# Patient Record
Sex: MALE | Race: WHITE | NOT HISPANIC OR LATINO | Employment: FULL TIME | URBAN - METROPOLITAN AREA
[De-identification: names, ages, dates, MRNs, and addresses within clinical notes are randomized per-mention and may not be internally consistent; named-entity substitution may affect disease eponyms.]

---

## 2018-06-01 ENCOUNTER — HISTORICAL (OUTPATIENT)
Dept: ADMINISTRATIVE | Facility: HOSPITAL | Age: 63
End: 2018-06-01

## 2018-06-04 LAB — FINAL CULTURE: NORMAL

## 2018-06-07 LAB
FINAL CULTURE: NORMAL
FINAL CULTURE: NORMAL

## 2021-01-12 ENCOUNTER — HISTORICAL (OUTPATIENT)
Dept: ADMINISTRATIVE | Facility: HOSPITAL | Age: 66
End: 2021-01-12

## 2021-07-28 ENCOUNTER — HISTORICAL (OUTPATIENT)
Dept: ADMINISTRATIVE | Facility: HOSPITAL | Age: 66
End: 2021-07-28

## 2022-04-07 ENCOUNTER — HISTORICAL (OUTPATIENT)
Dept: ADMINISTRATIVE | Facility: HOSPITAL | Age: 67
End: 2022-04-07

## 2022-04-23 VITALS
OXYGEN SATURATION: 98 % | BODY MASS INDEX: 28.63 KG/M2 | HEIGHT: 70 IN | DIASTOLIC BLOOD PRESSURE: 86 MMHG | WEIGHT: 200 LBS | SYSTOLIC BLOOD PRESSURE: 136 MMHG

## 2022-04-30 NOTE — DISCHARGE SUMMARY
Patient:   Arturo Arreola            MRN: [id number]            FIN: [id number]               Age:   63 years     Sex:  Male     :  1955   Associated Diagnoses:   None   Author:   Coleen Barnes NP      History of present illness:  This is a 63-year-old male who is a patient of Dr. Damon followed for IPF initially diagnosed in  and also chronic obstructive pulmonary disease stage II.  He presented to the emergency room at [facility] on 2018 with complaints of worsening shortness of breath.  The patient stated Monday he started having complaints of the development of a head cold which eventually got better and by Thursday he was feeling fine.  However Friday he went to do some yard work outside and began to have complaints of sudden worsening shortness of breath prompting further evaluation here.  He denies any fever, chills, or exposure to sick contacts.    Since his admit he feels significantly improved and no longer has complaints of worsening shortness of breath.  He does admit to having some wheezing the above symptoms, which were present on admit exam and have improved. This AM he is anxious to be discharged home.     DC meds:   Prednisone 40/30/10 mg ×3 days each   Z-Brando take as prescribed  Albuterol nebs 2.5 mg every 6 hours as needed shortness of breath/wheezing  Symbicort 80/4.5 mcg 2 puffs twice daily  Vytorin 10/40 mg every afternoon    Physical Assessment:   General:  male sitting in the bed not in any distress  Neuro: Alert awake oriented no neurological deficits appreciated  HEENT: Nasal cannula in place otherwise unremarkable  Cardiac: RRR  Respiratory: faint Wheezing heard to bilateral lower lobes   Abd: Soft, round, positive bowel sounds, nontender  Ext: No edema to bilateral lower extremities sequentials in place    discharge Impression:  1.  IPF  2.  Chronic obstructive pulmonary disease with acute exacerbation  3.  DysLipidemia    Discharge Plan:   1.  Meds  as above   2.  Will see in pulmonary clinic in 3 weeks post hospital follow up with NPs and then get back on schedule with Dr. Damon.

## 2022-04-30 NOTE — H&P
Patient:   Arturo Arreola            MRN: [id number]            FIN: [id number]               Age:   63 years     Sex:  Male     :  1955   Associated Diagnoses:   None   Author:   Coleen Barnes NP      History of present illness:  This is a 63-year-old male who is a patient of Dr. Damon followed for IPF initially diagnosed in  and also chronic obstructive pulmonary disease stage II.  He presented to the emergency room at [facility] on 2018 with complaints of worsening shortness of breath.  The patient stated Monday he started having complaints of the development of a head cold which eventually got better and by Thursday he was feeling fine.  However Friday he went to do some yard work outside and began to have complaints of sudden worsening shortness of breath prompting further evaluation here.  He denies any fever, chills, or exposure to sick contacts.  He did have one episode this morning of a productive cough which she describes as white to clear sputum production.  Since his admit he feels significantly improved and no longer has complaints of worsening shortness of breath.  He does admit to having some wheezing the above symptoms.    Allergies:  NKDA     Past medical history:  IPF, COPD, dyslipidemia    Past surgical history:  Open lung biopsy, coronary artery bypass grafting, knee surgery    Past social history:  Negative for drug or tobacco abuse.  Social alcohol reported    Home Medications:  Albuterol neb treatments every 6 hours as needed for shortness of breath wheezing  Symbicort 80/4.5 mcg 2 puffs twice daily  Vytorin 10/40 mg every afternoon    Review of systems:  As above in HPI otherwise negative    Physical Assessment:   Vitals: T-max 99.1, heart rate 65, respiratory rate 20, blood pressure 111/70, O2 saturation in the upper 90s on 3 L nasal cannula  General:  male sitting in the bed not in any distress  Neuro: Alert awake oriented no neurological deficits  appreciated  HEENT: Nasal cannula in place otherwise unremarkable  Cardiac: RRR  Respiratory: Wheezing heard on left side  Abd: Soft, round, positive bowel sounds, nontender  Ext: No edema to bilateral lower extremities sequentials in place    Labs:   No labs from this a.m.  Labs from 6/1/2018 sodium 138, potassium 4.6, chloride 103, CO2 28.0, calcium 9.0, glucose 91, BUN 21.0, creatinine 1.37, AST 26, ALT 34, alk phos 67, troponin less than 0.02, WBC 10.7, RBC 5.62, hemoglobin 16.3, hematocrit 50.9, platelet 133    Arterial blood gases:  PH 7.410, PCO2 35.0, PO2 201.0, HCO3 22.2 on BiPAP 12/6 45% FiO2    Radiological Data:   Findings:  Portable frontal view of the chest was obtained. The heart is not  significantly enlarged. Similar irregular bilateral opacities. No  pneumothorax seen.     Impression:   Similar appearance of the chest with irregular bilateral opacities.    Impression:  1.  IPF  2.  Chronic obstructive pulmonary disease with acute exacerbation  3.  DysLipidemia    Plan:   1.  Continue IV antibiotics for now  2.  Continue IV Solu-Medrol  3.  Sputum culture has been collected we will follow results  4.  Further recommendations to follow

## 2022-04-30 NOTE — ED PROVIDER NOTES
Patient:   Arturo Arreola            MRN: [id number]            FIN: [id number]               Age:   63 years     Sex:  Male     :  1955   Associated Diagnoses:   Sarcoidosis; Community acquired pneumonia; Acute respiratory distress   Author:   Fredis BOOGIE, Sarkis LEAHY      Basic Information   Time seen: Date & time 2018 16:36:00.   History source: Patient.   Arrival mode: Private vehicle.   History limitation: None.   Additional information: Patient's physician(s): BEBA Damon MD.      History of Present Illness   The patient presents with 62y/o M presents to the ED with cough and SOB that begin today while working.. Denies any CP. Reports hx of Sarcoidosis. and     I, Dr. Mcneal, assumed care of this patient at 1715     63 year old male with hx of sarcoidosis, HTN presents to the ED complaining of dry cough and SOB while working today. Denies any fever, CP,  or sputum production..  The onset was just prior to arrival.  The course/duration of symptoms is constant.  Degree at onset severe.  Degree at present severe.  The Exacerbating factors is none.  The Relieving factors is none.  Risk factors consist of hypertension and Sarcoidosis.  Prior episodes: rare.  Therapy today: none.  Associated symptoms: cough, denies chest pain and denies fever.  Additional history: none.        Review of Systems   Constitutional symptoms:  No fever,    Skin symptoms:  Negative except as documented in HPI.   Eye symptoms:  Negative except as documented in HPI.   ENMT symptoms:  Negative except as documented in HPI.   Respiratory symptoms:  Shortness of breath, cough, No sputum production,    Cardiovascular symptoms:  No chest pain,    Gastrointestinal symptoms:  Negative except as documented in HPI.   Genitourinary symptoms:  Negative except as documented in HPI.   Musculoskeletal symptoms:  Negative except as documented in HPI.   Neurologic symptoms:  Negative except as documented in HPI.      Health Status    Allergies: No known allergies.   Medications:  (Selected)   Prescriptions  Prescribed  Symbicort 80 mcg-4.5 mcg/inh inhalation aerosol: 2 puff(s), INH, BID, # 3 EA, 11 Refill(s), Pharmacy: [facility]/pharmacy #[id number]  Documented Medications  Documented  VYTORIN 10-40 MG TABLET: 1 tab(s), Oral, qPM.   Immunizations: Per nurse's notes.      Past Medical/ Family/ Social History   Medical history:    Active  Hypertension (82986911)  Pulmonary fibrosis (25564890)  COPD (chronic obstructive pulmonary disease) ([id number])  Dyslipidemia ([id number])  Resolved  Wears glasses (599649266):  Resolved.  High cholesterol ([id number]):  Resolved.  Irregular heart beat (232331530):  Resolved.  Sarcoidosis ([id number]):  Resolved on 3/8/2017 at 62 years..   Surgical history:    Open biopsy of lung ([id number]) on 11/12/2008 at 53 Years.  Coronary artery bypass graft ([id number]) in 2008 at 53 Years.  History of knee surgery ([id number]).  Comments:  3/8/2017 10:01 - Dasha Calderón  right knee.   Family history: Not significant.   Social history:    Social & Psychosocial Habits    Alcohol  12/10/2012 Risk Assessment: Low Risk    12/10/2012  Use: Current    Type: Wine    Frequency: 3-5 times per week    Substance Abuse  12/10/2012 Risk Assessment: Denies Substance Abuse    01/19/2017  Use: Never    Tobacco  12/10/2012 Risk Assessment: Denies Tobacco Use    01/19/2017  Use: Never smoker  .      Physical Examination               Vital Signs   Vital Signs   6/1/2018 18:03 CDT       Peripheral Pulse Rate     140 bpm  HI                             Heart Rate Monitored      140 bpm  HI                             Respiratory Rate          26 br/min  HI                             FIO2                      45 %                             SpO2                      98 %                             Oxygen Therapy             BiPAP                             Systolic Blood Pressure   146 mmHg  HI                             Diastolic Blood Pressure  97 mmHg  HI                             Mean Arterial Pressure, Cuff              113 mmHg    6/1/2018 17:50 CDT       Peripheral Pulse Rate     139 bpm  HI                             Heart Rate Monitored      138 bpm  HI                             Respiratory Rate          30 br/min  HI  (Modified)                            FIO2                      45 %                             SpO2                      98 %                             Oxygen Therapy            BiPAP                             Systolic Blood Pressure   153 mmHg  HI                             Diastolic Blood Pressure  97 mmHg  HI                             Mean Arterial Pressure, Cuff              116 mmHg    6/1/2018 17:39 CDT       Peripheral Pulse Rate     132 bpm  HI                             Heart Rate Monitored      132 bpm  HI                             Respiratory Rate          32 br/min  HI  (Modified)                            SpO2                      98 %                             Oxygen Therapy            BiPAP                             Systolic Blood Pressure   159 mmHg  HI                             Diastolic Blood Pressure  121 mmHg  HI                             Mean Arterial Pressure, Cuff              134 mmHg                             Blood Pressure Cuff Size  Adult    6/1/2018 17:28 CDT       Peripheral Pulse Rate     130 bpm  HI                             Heart Rate Monitored      130 bpm  HI                             Respiratory Rate          33 br/min  HI  (Modified)                            FIO2                      45 %                             SpO2                      99 %                             Oxygen Therapy            BiPAP                             Systolic Blood Pressure   166 mmHg  HI                             Diastolic Blood Pressure  107 mmHg   HI                             Blood Pressure Cuff Size  Adult    6/1/2018 17:25 CDT       Peripheral Pulse Rate     131 bpm  HI                             Heart Rate Monitored      130 bpm  HI                             Respiratory Rate          33 br/min  HI                             FIO2                      45 %                             SpO2                      98 %                             Oxygen Therapy            BiPAP    6/1/2018 17:20 CDT       Peripheral Pulse Rate     140 bpm  HI                             Heart Rate Monitored      138 bpm  HI                             Respiratory Rate          30 br/min  HI  (Modified)                            SpO2                      95 %                             Oxygen Therapy            BiPAP    6/1/2018 17:15 CDT       Peripheral Pulse Rate     132 bpm  HI                             Heart Rate Monitored      132 bpm  HI                             Respiratory Rate          25 br/min  HI                             SpO2                      96 %                             Oxygen Therapy            Nasal cannula                             Oxygen Flow Rate          3 L/min                             Systolic Blood Pressure   152 mmHg  HI                             Diastolic Blood Pressure  105 mmHg  HI                             Blood Pressure Cuff Size  Adult    6/1/2018 17:13 CDT       Peripheral Pulse Rate     131 bpm  HI                             Respiratory Rate          28 br/min  HI    6/1/2018 16:48 CDT       Respiratory Rate          35 br/min  HI                             Oxygen Therapy            Nasal cannula                             Oxygen Flow Rate          3 L/min    6/1/2018 16:35 CDT       Temperature Oral          36.8 DegC                             Temperature Oral (calculated)             98.24 DegF                             Peripheral Pulse Rate     116 bpm  HI                             Respiratory Rate           45 br/min  HI                             SpO2                      93 %  LOW                             Systolic Blood Pressure   162 mmHg  HI                             Diastolic Blood Pressure  92 mmHg  HI  .      Vital Signs (last 24 hrs)_____  Last Charted___________  Temp Oral     36.8 DegC  (JUN 01 16:35)  Heart Rate Peripheral   H 140bpm  (JUN 01 18:03)  Resp Rate         H 26br/min  (JUN 01 18:03)  SBP      H 146mmHg  (JUN 01 18:03)  DBP      H 97mmHg  (JUN 01 18:03)  SpO2      98 %  (JUN 01 18:03)  .   Measurements   6/1/2018 16:35 CDT       Weight Dosing             90.5 kg                             Weight Measured and Calculated in Lbs     199.52 lb                             Weight Estimated          90.5 kg                             Height/Length Estimated   172 cm                             Body Mass Index Estimated 30.59 kg/m2  .   Basic Oxygen Information   6/1/2018 18:03 CDT       SpO2                      98 %                             Oxygen Therapy            BiPAP    6/1/2018 17:50 CDT       SpO2                      98 %                             Oxygen Therapy            BiPAP    6/1/2018 17:39 CDT       SpO2                      98 %                             Oxygen Therapy            BiPAP    6/1/2018 17:28 CDT       SpO2                      99 %                             Oxygen Therapy            BiPAP    6/1/2018 17:25 CDT       SpO2                      98 %                             Oxygen Therapy            BiPAP    6/1/2018 17:20 CDT       SpO2                      95 %                             Oxygen Therapy            BiPAP    6/1/2018 17:15 CDT       SpO2                      96 %                             Oxygen Flow Rate          3 L/min                             Oxygen Therapy            Nasal cannula    6/1/2018 16:48 CDT       Oxygen Flow Rate          3 L/min                             Oxygen Therapy            Nasal cannula    6/1/2018 16:35 CDT        SpO2                      93 %  LOW  .   General:  Alert, moderate distress.    Skin:  Warm, dry, intact.    Head:  Normocephalic, atraumatic.    Eye   Cardiovascular:  Normal peripheral perfusion, No edema, Tachycardia.    Respiratory:  Breath sounds are equal, Symmetrical chest wall expansion, speaking in 1-2 word sentences, Respirations: Tachypneic, respiratory distress moderate, shallow, Breath sounds: Bilateral, crackles present.    Gastrointestinal:  Soft, Nontender, Non distended, Normal bowel sounds, Guarding: Negative, Rebound: Negative.    Musculoskeletal:  No deformity.   Neurological:  Alert and oriented to person, place, time, and situation, No focal neurological deficit observed, normal speech observed.    Psychiatric:  Cooperative, appropriate mood & affect.       Medical Decision Making   Differential Diagnosis:  Pneumonia, bronchitis, congestive heart failure, chronic obstructive pulmonary disease.    Rationale:  This came in in severe distress.  Will maintain his sats did fairly shallow rapid breathing which improved with BiPAP supplement oxygen blood pressure control.  He was given Solu-Medrol and antibiotics in the emergency department x-ray possibly some infiltrates afebrile no leukocytosis we will continue antibiotics for possible community acquired pneumonia at this time pulmonary may decide to de-escalate these were stopped them if symptoms felt more consistent with sarcoid exacerbation..   Documents reviewed:  Emergency department nurses' notes.   Electrocardiogram:  Time 6/1/2018 16:39:00, rate 116, Sinus tachycardia, incomplete RBBB, LAD.    Results review:  Lab results : Lab View   6/1/2018 17:35 CDT       Sample ABG                art                             Treatment                 bipap                             Site                      Radial Rt                             pH Art                    7.410                             pCO2 Art                  35.0 mmHg                              pO2 Art                   201.0 mmHg  HI                             HCO3 Art                  22.2 mmol/L                             CO2 Totl Art              23.3 mmol/L                             O2 Sat Art                99.7 %  HI                             D base                    -1.7                             THB ABG                   17.3 gm/dL                             CO Hgb                    1.0 %  NA                             Met Hgb Art               1.5 %                             O2 Hgb                    96.9 %                             CaO2                      23.9 mL/dL                             Ionized Calcium           1.12 mmol/L                             Sodium RT                 134.0 mmol/L  LOW                             Potassium RT              4.00 mmol/L                             Allens                    N/A                             Setting 1 ABG             bipap 12/+6                             Setting 2 ABG             45%                             Setting 3 ABG             tot RR 28    6/1/2018 16:59 CDT       POC BNP iSTAT             <15 pg/mL    6/1/2018 16:46 CDT       Sodium Lvl                138 mmol/L                             Potassium Lvl             4.6 mmol/L                             Chloride                  103 mmol/L                             CO2                       28.0 mmol/L                             Calcium Lvl               9.0 mg/dL                             Glucose Lvl               91 mg/dL                             BUN                       21.0 mg/dL  HI                             Creatinine                1.37 mg/dL  HI                             eGFR-AA                   >60 mL/min/1.73 m2  NA                             eGFR-ANGELO                  56 mL/min/1.73 m2  NA                             Bili Total                0.8 mg/dL                             Bili Direct               0.10  mg/dL                             Bili Indirect             0.70 mg/dL                             AST                       26 unit/L                             ALT                       34 unit/L                             Alk Phos                  67 unit/L                             Total Protein             7.7 gm/dL                             Albumin Lvl               4.30 gm/dL                             Globulin                  3.40 gm/dL                             A/G Ratio                 1.3  NA                             BNP                       <15 pg/mL                             Troponin-I                <0.02 ng/mL                             WBC                       10.7 x10(3)/mcL                             RBC                       5.62 x10(6)/mcL                             Hgb                       16.3 gm/dL                             Hct                       50.9 %                             Platelet                  133 x10(3)/mcL                             MCV                       90.6 fL                             MCH                       29.0 pg                             MCHC                      32.0 gm/dL  LOW                             RDW                       13.6 %                             MPV                       9.5 fL                             Abs Neut                  9.44 x10(3)/mcL  HI                             Neutro Auto               88 %  NA                             Lymph Auto                5 %  NA                             Mono Auto                 5 %  NA                             Eos Auto                  1 %  NA                             Abs Eos                   0.1 x10(3)/mcL                             Basophil Auto             0 %  NA                             Abs Neutro                9.44 x10(3)/mcL  HI                             Abs Lymph                 0.6 x10(3)/mcL                             Abs Mono                   0.6 x10(3)/mcL                             Abs Baso                  0.0 x10(3)/mcL  .   Radiology results:  Rad Results (ST)  < 12 hrs   Accession: [id number]  Order: XR Chest 1 View  Report Dt/Tm: 06/01/2018 17:25  Report:   History:  Short of breath     Reference:  19 June 2016     Findings:  Portable frontal view of the chest was obtained. The heart is not  significantly enlarged. Similar irregular bilateral opacities. No  pneumothorax seen.     Impression:   Similar appearance of the chest with irregular bilateral opacities.      .      Procedure   Critical care note   Total time: 40 minutes spent engaged in work directly related to patient care and/ or available for direct patient care.   Critical condition(s) addressed for impending deterioration include: airway, respiratory, cardiovascular.   Associated risk factors: hypoxia, hypertension.   Management: bedside assessment, supervision of care, Interpretation chest x-ray, Interventions (hemodynamic management, ventilator management), Case review medical specialist.      Impression and Plan   Diagnosis   Sarcoidosis (FWA97-QA D86.9)   Community acquired pneumonia (DLC71-MZ J18.9)   Acute respiratory distress (JCP73-BZ R06.03)      Calls-Consults   -  BEBA Damon MD, will admit.    Plan   Condition: Stable.    Disposition: Admit to Inpatient Telemetry Unit.    Counseled: Patient, Regarding diagnosis, Regarding diagnostic results, Regarding treatment plan, Patient indicated understanding of instructions.    Notes: ILuiz, acted solely as a scribe for and in the presence of Dr. Mcneal who performed the service., I, Dr Mcneal have read note from scribe and I agree with history and physical except as amended by me.  All information was dictated from my history and my examination of patient..

## 2022-10-03 ENCOUNTER — HOSPITAL ENCOUNTER (OUTPATIENT)
Dept: RADIOLOGY | Facility: HOSPITAL | Age: 67
Discharge: HOME | DRG: 198 | End: 2022-10-03
Attending: NURSE PRACTITIONER
Payer: COMMERCIAL

## 2022-10-03 ENCOUNTER — HOSPITAL ENCOUNTER (INPATIENT)
Facility: HOSPITAL | Age: 67
LOS: 4 days | Discharge: HOME | DRG: 198 | End: 2022-10-07
Attending: INTERNAL MEDICINE | Admitting: INTERNAL MEDICINE
Payer: COMMERCIAL

## 2022-10-03 DIAGNOSIS — J84.10 PULMONARY FIBROSIS: ICD-10-CM

## 2022-10-03 DIAGNOSIS — I49.9 ARRHYTHMIA: ICD-10-CM

## 2022-10-03 DIAGNOSIS — J84.10 PULMONARY EMPHYSEMA WITH FIBROSIS OF LUNG: ICD-10-CM

## 2022-10-03 DIAGNOSIS — J84.112 ACUTE EXACERBATION OF IDIOPATHIC PULMONARY FIBROSIS: Primary | ICD-10-CM

## 2022-10-03 DIAGNOSIS — J43.9 PULMONARY EMPHYSEMA WITH FIBROSIS OF LUNG: ICD-10-CM

## 2022-10-03 LAB
ALBUMIN SERPL-MCNC: 4.1 GM/DL (ref 3.4–4.8)
ALBUMIN/GLOB SERPL: 1.4 RATIO (ref 1.1–2)
ALP SERPL-CCNC: 75 UNIT/L (ref 40–150)
ALT SERPL-CCNC: 54 UNIT/L (ref 0–55)
AST SERPL-CCNC: 54 UNIT/L (ref 5–34)
BASOPHILS # BLD AUTO: 0.02 X10(3)/MCL (ref 0–0.2)
BASOPHILS NFR BLD AUTO: 0.2 %
BILIRUBIN DIRECT+TOT PNL SERPL-MCNC: 0.7 MG/DL
BNP BLD-MCNC: 17.8 PG/ML
BUN SERPL-MCNC: 21.9 MG/DL (ref 8.4–25.7)
CALCIUM SERPL-MCNC: 9.3 MG/DL (ref 8.8–10)
CHLORIDE SERPL-SCNC: 102 MMOL/L (ref 98–107)
CO2 SERPL-SCNC: 25 MMOL/L (ref 23–31)
CREAT SERPL-MCNC: 1.43 MG/DL (ref 0.73–1.18)
EOSINOPHIL # BLD AUTO: 0 X10(3)/MCL (ref 0–0.9)
EOSINOPHIL NFR BLD AUTO: 0 %
ERYTHROCYTE [DISTWIDTH] IN BLOOD BY AUTOMATED COUNT: 14.8 % (ref 11.5–17)
FLUAV AG UPPER RESP QL IA.RAPID: NOT DETECTED
FLUBV AG UPPER RESP QL IA.RAPID: NOT DETECTED
GFR SERPLBLD CREATININE-BSD FMLA CKD-EPI: 53 MLS/MIN/1.73/M2
GLOBULIN SER-MCNC: 3 GM/DL (ref 2.4–3.5)
GLUCOSE SERPL-MCNC: 248 MG/DL (ref 82–115)
HCT VFR BLD AUTO: 55.8 % (ref 42–52)
HGB BLD-MCNC: 18 GM/DL (ref 14–18)
IMM GRANULOCYTES # BLD AUTO: 0.13 X10(3)/MCL (ref 0–0.04)
IMM GRANULOCYTES NFR BLD AUTO: 1.1 %
LYMPHOCYTES # BLD AUTO: 1.1 X10(3)/MCL (ref 0.6–4.6)
LYMPHOCYTES NFR BLD AUTO: 9.3 %
MCH RBC QN AUTO: 28.9 PG (ref 27–31)
MCHC RBC AUTO-ENTMCNC: 32.3 MG/DL (ref 33–36)
MCV RBC AUTO: 89.6 FL (ref 80–94)
MONOCYTES # BLD AUTO: 0.72 X10(3)/MCL (ref 0.1–1.3)
MONOCYTES NFR BLD AUTO: 6.1 %
MRSA PCR SCRN (OHS): NOT DETECTED
NEUTROPHILS # BLD AUTO: 9.9 X10(3)/MCL (ref 2.1–9.2)
NEUTROPHILS NFR BLD AUTO: 83.3 %
NRBC BLD AUTO-RTO: 0 %
PLATELET # BLD AUTO: 238 X10(3)/MCL (ref 130–400)
PMV BLD AUTO: 9.5 FL (ref 7.4–10.4)
POTASSIUM SERPL-SCNC: 4.9 MMOL/L (ref 3.5–5.1)
PROT SERPL-MCNC: 7.1 GM/DL (ref 5.8–7.6)
RBC # BLD AUTO: 6.23 X10(6)/MCL (ref 4.7–6.1)
SARS-COV-2 RNA RESP QL NAA+PROBE: NOT DETECTED
SODIUM SERPL-SCNC: 138 MMOL/L (ref 136–145)
WBC # SPEC AUTO: 11.8 X10(3)/MCL (ref 4.5–11.5)

## 2022-10-03 PROCEDURE — 21400001 HC TELEMETRY ROOM

## 2022-10-03 PROCEDURE — 99900035 HC TECH TIME PER 15 MIN (STAT)

## 2022-10-03 PROCEDURE — 11000001 HC ACUTE MED/SURG PRIVATE ROOM

## 2022-10-03 PROCEDURE — 25000242 PHARM REV CODE 250 ALT 637 W/ HCPCS: Performed by: NURSE PRACTITIONER

## 2022-10-03 PROCEDURE — 36415 COLL VENOUS BLD VENIPUNCTURE: CPT | Performed by: NURSE PRACTITIONER

## 2022-10-03 PROCEDURE — 71046 X-RAY EXAM CHEST 2 VIEWS: CPT | Mod: TC

## 2022-10-03 PROCEDURE — 85025 COMPLETE CBC W/AUTO DIFF WBC: CPT | Performed by: NURSE PRACTITIONER

## 2022-10-03 PROCEDURE — 83880 ASSAY OF NATRIURETIC PEPTIDE: CPT | Performed by: NURSE PRACTITIONER

## 2022-10-03 PROCEDURE — 25500020 PHARM REV CODE 255: Performed by: INTERNAL MEDICINE

## 2022-10-03 PROCEDURE — 0241U COVID/FLU A&B PCR: CPT | Performed by: NURSE PRACTITIONER

## 2022-10-03 PROCEDURE — 94640 AIRWAY INHALATION TREATMENT: CPT

## 2022-10-03 PROCEDURE — 25000003 PHARM REV CODE 250: Performed by: NURSE PRACTITIONER

## 2022-10-03 PROCEDURE — 63600175 PHARM REV CODE 636 W HCPCS: Performed by: NURSE PRACTITIONER

## 2022-10-03 PROCEDURE — 80053 COMPREHEN METABOLIC PANEL: CPT | Performed by: INTERNAL MEDICINE

## 2022-10-03 PROCEDURE — 94761 N-INVAS EAR/PLS OXIMETRY MLT: CPT

## 2022-10-03 PROCEDURE — 99285 EMERGENCY DEPT VISIT HI MDM: CPT | Mod: 25

## 2022-10-03 PROCEDURE — 87641 MR-STAPH DNA AMP PROBE: CPT | Performed by: NURSE PRACTITIONER

## 2022-10-03 RX ORDER — ONDANSETRON 2 MG/ML
4 INJECTION INTRAMUSCULAR; INTRAVENOUS EVERY 8 HOURS PRN
Status: DISCONTINUED | OUTPATIENT
Start: 2022-10-03 | End: 2022-10-07 | Stop reason: HOSPADM

## 2022-10-03 RX ORDER — HYDRALAZINE HYDROCHLORIDE 20 MG/ML
10 INJECTION INTRAMUSCULAR; INTRAVENOUS EVERY 4 HOURS PRN
Status: DISCONTINUED | OUTPATIENT
Start: 2022-10-03 | End: 2022-10-07 | Stop reason: HOSPADM

## 2022-10-03 RX ORDER — HYDRALAZINE HYDROCHLORIDE 20 MG/ML
10 INJECTION INTRAMUSCULAR; INTRAVENOUS EVERY 6 HOURS PRN
Status: DISCONTINUED | OUTPATIENT
Start: 2022-10-03 | End: 2022-10-03

## 2022-10-03 RX ORDER — ACETAMINOPHEN 325 MG/1
650 TABLET ORAL EVERY 4 HOURS PRN
Status: DISCONTINUED | OUTPATIENT
Start: 2022-10-03 | End: 2022-10-07 | Stop reason: HOSPADM

## 2022-10-03 RX ORDER — GUAIFENESIN 600 MG/1
600 TABLET, EXTENDED RELEASE ORAL 2 TIMES DAILY
Status: DISCONTINUED | OUTPATIENT
Start: 2022-10-03 | End: 2022-10-07 | Stop reason: HOSPADM

## 2022-10-03 RX ORDER — IPRATROPIUM BROMIDE AND ALBUTEROL SULFATE 2.5; .5 MG/3ML; MG/3ML
3 SOLUTION RESPIRATORY (INHALATION) EVERY 4 HOURS PRN
Status: DISCONTINUED | OUTPATIENT
Start: 2022-10-03 | End: 2022-10-07 | Stop reason: HOSPADM

## 2022-10-03 RX ADMIN — IPRATROPIUM BROMIDE AND ALBUTEROL SULFATE 3 ML: 2.5; .5 SOLUTION RESPIRATORY (INHALATION) at 10:10

## 2022-10-03 RX ADMIN — GUAIFENESIN 600 MG: 600 TABLET, EXTENDED RELEASE ORAL at 09:10

## 2022-10-03 RX ADMIN — METHYLPREDNISOLONE SODIUM SUCCINATE 80 MG: 40 INJECTION, POWDER, FOR SOLUTION INTRAMUSCULAR; INTRAVENOUS at 05:10

## 2022-10-03 RX ADMIN — PIPERACILLIN AND TAZOBACTAM 4.5 G: 4; .5 INJECTION, POWDER, LYOPHILIZED, FOR SOLUTION INTRAVENOUS; PARENTERAL at 05:10

## 2022-10-03 RX ADMIN — IOPAMIDOL 100 ML: 755 INJECTION, SOLUTION INTRAVENOUS at 08:10

## 2022-10-03 RX ADMIN — IPRATROPIUM BROMIDE AND ALBUTEROL SULFATE 3 ML: 2.5; .5 SOLUTION RESPIRATORY (INHALATION) at 05:10

## 2022-10-03 RX ADMIN — AZITHROMYCIN MONOHYDRATE 500 MG: 500 INJECTION, POWDER, LYOPHILIZED, FOR SOLUTION INTRAVENOUS at 06:10

## 2022-10-03 NOTE — H&P
[facility] - Emergency Dept  Pulmonary Critical Care Note    Patient Name: Arturo Arreola  MRN: [id number]  Admission Date: 10/3/2022  Hospital Length of Stay: 0 days  Code Status: Full Code  Attending Provider: SRIDHAR Damon MD  Primary Care Provider: Daniel Jones MD     Subjective:     HPI:   This is a 67 year old male with a past history of idiopathic pulmonary fibrosis diagnosed via open lung biopsy in 2008. He also has stage II COPD by pulmonary function testing. He has been doing fairly well on maintenance inhalers and no anti fibrotic drugs, imaging has been stable. He was seen in office on Friday for an exacerbation and was given azithromycin and oral steroids. He began feeling worse today and presented again to the office. CXR revealed chronic hilar fullness however exam had worsened. He was significantly dyspneic and wheezing and was sent for direct admission. Labs and CT chest are currently pending.     Hospital Course/Significant events:  N/A    24 Hour Interval History:  N/A    Past Medical History:   Diagnosis Date    COPD (chronic obstructive pulmonary disease)     Dyslipidemia     Essential (primary) hypertension     High cholesterol     Irregular heart beat     Pulmonary fibrosis     Sarcoidosis, unspecified     Skin cancer        Past Surgical History:   Procedure Laterality Date    LUNG BIOPSY         Social History     Socioeconomic History    Marital status:    Tobacco Use    Smoking status: Never     Passive exposure: Never    Smokeless tobacco: Never           Current Outpatient Medications   Medication Instructions    albuterol (PROVENTIL) 2.5 mg, Nebulization, Every 6 hours PRN, Rescue    amLODIPine (NORVASC) 10 mg, Oral    azithromycin (Z-ISAIAH) 250 MG tablet Take 2 tablets by mouth on day 1; Take 1 tablet by mouth on days 2-5<BR>    budesonide-formoterol 80-4.5 mcg (SYMBICORT) 80-4.5 mcg/actuation HFAA   See Instructions, INHALE 2 PUFFS TWICE A DAY, # 3 EA, 3  Refill(s), Pharmacy: [facility]/pharmacy #[id number], 177.5, cm, Height/Length Dosing, 07/20/21 9:02:00 CDT, 90.72, kg, Weight Dosing, 07/20/21 9:02:00 CDT    ezetimibe-simvastatin 10-40 mg (VYTORIN) 10-40 mg per tablet 1 tablet, Oral, Nightly    hydrALAZINE (APRESOLINE) 12.5 mg, Oral    predniSONE (DELTASONE) 10 MG tablet Take 4 tablets (40 mg total) by mouth once daily for 3 days, THEN 2 tablets (20 mg total) once daily for 3 days, THEN 1 tablet (10 mg total) once daily for 3 days.       Current Inpatient Medications   azithromycin (ZITHROMAX) 500 mg IVPB  500 mg Intravenous Q24H    methylPREDNISolone sodium succinate injection  80 mg Intravenous Q8H    piperacillin-tazobactam (ZOSYN) IVPB  4.5 g Intravenous Q8H         Review of Systems   Respiratory:  Positive for cough and shortness of breath.         Objective:     No intake or output data in the 24 hours ending 10/03/22 1609      Vital Signs (Most Recent):  Temp: 98.1 °F (36.7 °C) (10/03/22 1544)  Pulse: 96 (10/03/22 1544)  Resp: 20 (10/03/22 1544)  BP: (!) 170/83 (10/03/22 1544)  SpO2: (!) 94 % (10/03/22 1544)    Body mass index is 29.27 kg/m².  Weight: 92.5 kg (204 lb) Vital Signs (24h Range):  Temp:  [98.1 °F (36.7 °C)-98.2 °F (36.8 °C)] 98.1 °F (36.7 °C)  Pulse:  [96-97] 96  Resp:  [20] 20  SpO2:  [94 %-95 %] 94 %  BP: (144-170)/(80-83) 170/83     Physical Exam  Vitals reviewed.   Constitutional:       Appearance: Normal appearance.   HENT:      Head: Normocephalic and atraumatic.   Eyes:      Pupils: Pupils are equal, round, and reactive to light.   Cardiovascular:      Rate and Rhythm: Normal rate.      Heart sounds: Normal heart sounds.   Pulmonary:      Breath sounds: Wheezing and rhonchi present.   Abdominal:      Palpations: Abdomen is soft.   Musculoskeletal:         General: Normal range of motion.      Cervical back: Neck supple.   Skin:     General: Skin is warm and dry.   Neurological:      General: No focal deficit present.      Mental Status: He is alert  and oriented to person, place, and time.         Lines/Drains/Airways       None                   Significant Labs:    No results found for: WBC, HGB, HCT, MCV, PLT      BMP  No results found for: NA, K, CL, CO2, BUN, CREATININE, CALCIUM, ANIONGAP, ESTGFRAFRICA, EGFRNONAA    ABG  No results for input(s): PH, PO2, PCO2, HCO3, BE in the last 168 hours.         Significant Imaging:  I have reviewed the pertinent imaging within the past 24 hours.        Assessment/Plan:     Assessment  Idiopathic pulmonary fibrosis with exacerbation  Stage II COPD  Hypertension      Plan  Will admit patient and begin IV steroids and antibiotics with azithromycin and zosyn  COVID/Flu/Resp PCR panel pending  Respiratory culture ordered  CT chest pending  O2 as needed to maintain oxygen saturation greater than 88%  Nebs and mucolytics ordered  Encouraged IS  Further to follow when workup completed     DEONDRE Chahal  Pulmonary Critical Care Medicine  [facility] - Emergency Dept

## 2022-10-04 PROCEDURE — 21400001 HC TELEMETRY ROOM

## 2022-10-04 PROCEDURE — 63600175 PHARM REV CODE 636 W HCPCS: Performed by: NURSE PRACTITIONER

## 2022-10-04 PROCEDURE — 25000003 PHARM REV CODE 250: Performed by: NURSE PRACTITIONER

## 2022-10-04 PROCEDURE — 25000242 PHARM REV CODE 250 ALT 637 W/ HCPCS: Performed by: NURSE PRACTITIONER

## 2022-10-04 PROCEDURE — 94640 AIRWAY INHALATION TREATMENT: CPT

## 2022-10-04 PROCEDURE — 94761 N-INVAS EAR/PLS OXIMETRY MLT: CPT

## 2022-10-04 RX ADMIN — IPRATROPIUM BROMIDE AND ALBUTEROL SULFATE 3 ML: 2.5; .5 SOLUTION RESPIRATORY (INHALATION) at 11:10

## 2022-10-04 RX ADMIN — METHYLPREDNISOLONE SODIUM SUCCINATE 80 MG: 40 INJECTION, POWDER, FOR SOLUTION INTRAMUSCULAR; INTRAVENOUS at 02:10

## 2022-10-04 RX ADMIN — IPRATROPIUM BROMIDE AND ALBUTEROL SULFATE 3 ML: 2.5; .5 SOLUTION RESPIRATORY (INHALATION) at 07:10

## 2022-10-04 RX ADMIN — METHYLPREDNISOLONE SODIUM SUCCINATE 80 MG: 40 INJECTION, POWDER, FOR SOLUTION INTRAMUSCULAR; INTRAVENOUS at 08:10

## 2022-10-04 RX ADMIN — METHYLPREDNISOLONE SODIUM SUCCINATE 80 MG: 40 INJECTION, POWDER, FOR SOLUTION INTRAMUSCULAR; INTRAVENOUS at 04:10

## 2022-10-04 RX ADMIN — PIPERACILLIN AND TAZOBACTAM 4.5 G: 4; .5 INJECTION, POWDER, LYOPHILIZED, FOR SOLUTION INTRAVENOUS; PARENTERAL at 02:10

## 2022-10-04 RX ADMIN — PIPERACILLIN AND TAZOBACTAM 4.5 G: 4; .5 INJECTION, POWDER, LYOPHILIZED, FOR SOLUTION INTRAVENOUS; PARENTERAL at 08:10

## 2022-10-04 RX ADMIN — PIPERACILLIN AND TAZOBACTAM 4.5 G: 4; .5 INJECTION, POWDER, LYOPHILIZED, FOR SOLUTION INTRAVENOUS; PARENTERAL at 04:10

## 2022-10-04 RX ADMIN — IPRATROPIUM BROMIDE AND ALBUTEROL SULFATE 3 ML: 2.5; .5 SOLUTION RESPIRATORY (INHALATION) at 03:10

## 2022-10-04 RX ADMIN — GUAIFENESIN 600 MG: 600 TABLET, EXTENDED RELEASE ORAL at 08:10

## 2022-10-04 RX ADMIN — GUAIFENESIN 600 MG: 600 TABLET, EXTENDED RELEASE ORAL at 09:10

## 2022-10-04 RX ADMIN — AZITHROMYCIN MONOHYDRATE 500 MG: 500 INJECTION, POWDER, LYOPHILIZED, FOR SOLUTION INTRAVENOUS at 06:10

## 2022-10-04 NOTE — PLAN OF CARE
10/04/22 1520   Discharge Assessment   Assessment Type Discharge Planning Assessment   Source of Information patient   Communicated SENTHIL with patient/caregiver Date not available/Unable to determine   Reason For Admission Shortness of breath and cough   Lives With friend(s)   Do you expect to return to your current living situation? Yes   Prior to hospitilization cognitive status: Alert/Oriented   Current cognitive status: Alert/Oriented   Walking or Climbing Stairs Difficulty none   Dressing/Bathing Difficulty none   Home Accessibility stairs to enter home   Number of Stairs, Main Entrance six   Stair Railings, Main Entrance railings on both sides of stairs   Home Layout Able to live on 1st floor   Do you currently have service(s) that help you manage your care at home? No   Do you take prescription medications? Yes   Do you have prescription coverage? Yes   Do you have any problems affording any of your prescribed medications? No   Is the patient taking medications as prescribed? yes   Who is going to help you get home at discharge? Friend Melodie   Are you on dialysis? No   Do you take coumadin? No   Discharge Plan A Home   Discharge Plan B Home   DME Needed Upon Discharge  none   Discharge Barriers Identified None   Relationship/Environment   Name(s) of Who Lives With Patient Melodie (friend)

## 2022-10-04 NOTE — NURSING
Nurses Note -- 4 Eyes      10/4/2022   12:38 AM      Skin assessed during: Admit      [x] No Pressure Injuries Present    []Prevention Measures Documented      [] Yes- Altered Skin Integrity Present or Discovered   [] LDA Added if Not in Epic (Describe Wound)   [] New Altered Skin Integrity was Present on Admit and Documented in LDA   [] Wound Image Taken    Wound Care Consulted? No    Attending Nurse:  Sangeeta Bullard RN     Second RN/Staff Member:  Yohana Terrazas LPN

## 2022-10-04 NOTE — PROGRESS NOTES
[facility] - Emergency Dept  Pulmonary Critical Care Note    Patient Name: Arturo Arreola  MRN: [id number]  Admission Date: 10/3/2022  Hospital Length of Stay: 1 days  Code Status: Full Code  Attending Provider: SRIDHAR Damon MD  Primary Care Provider: Daniel Jones MD     Subjective:     HPI:   This is a 67 year old male with a past history of idiopathic pulmonary fibrosis diagnosed via open lung biopsy in 2008. He also has stage II COPD by pulmonary function testing. He has been doing fairly well on maintenance inhalers and no anti fibrotic drugs, imaging has been stable. He was seen in office on Friday for an exacerbation and was given azithromycin and oral steroids. He began feeling worse today and presented again to the office. CXR revealed chronic hilar fullness however exam had worsened. He was significantly dyspneic and wheezing and was sent for direct admission.     Hospital Course/Significant events:  CT Chest 10/3 with extensive emphysematous changes in bilateral upper lobes with ground glass changes and perihilar enlargement    24 Hour Interval History:  Doing well on room air, still very dyspneic and wheezing    Past Medical History:   Diagnosis Date    COPD (chronic obstructive pulmonary disease)     Dyslipidemia     Essential (primary) hypertension     High cholesterol     Irregular heart beat     Pulmonary fibrosis     Sarcoidosis, unspecified     Skin cancer        Past Surgical History:   Procedure Laterality Date    LUNG BIOPSY         Social History     Socioeconomic History    Marital status:    Tobacco Use    Smoking status: Never     Passive exposure: Never    Smokeless tobacco: Never   Substance and Sexual Activity    Alcohol use: Never    Drug use: Never    Sexual activity: Not Currently     Birth control/protection: Abstinence           Current Outpatient Medications   Medication Instructions    albuterol (PROVENTIL) 2.5 mg, Nebulization, Every 6 hours PRN, Rescue     amLODIPine (NORVASC) 10 mg, Oral    azithromycin (Z-ISAIAH) 250 MG tablet Take 2 tablets by mouth on day 1; Take 1 tablet by mouth on days 2-5<BR>    budesonide-formoterol 80-4.5 mcg (SYMBICORT) 80-4.5 mcg/actuation HFAA   See Instructions, INHALE 2 PUFFS TWICE A DAY, # 3 EA, 3 Refill(s), Pharmacy: [facility]/pharmacy #[id number], 177.5, cm, Height/Length Dosing, 07/20/21 9:02:00 CDT, 90.72, kg, Weight Dosing, 07/20/21 9:02:00 CDT    ezetimibe-simvastatin 10-40 mg (VYTORIN) 10-40 mg per tablet 1 tablet, Oral, Nightly    hydrALAZINE (APRESOLINE) 12.5 mg, Oral    predniSONE (DELTASONE) 10 MG tablet Take 4 tablets (40 mg total) by mouth once daily for 3 days, THEN 2 tablets (20 mg total) once daily for 3 days, THEN 1 tablet (10 mg total) once daily for 3 days.       Current Inpatient Medications   azithromycin (ZITHROMAX) 500 mg IVPB  500 mg Intravenous Q24H    guaiFENesin  600 mg Oral BID    methylPREDNISolone sodium succinate injection  80 mg Intravenous Q8H    piperacillin-tazobactam (ZOSYN) IVPB  4.5 g Intravenous Q8H         Review of Systems   Respiratory:  Positive for cough and shortness of breath.         Objective:       Intake/Output Summary (Last 24 hours) at 10/4/2022 0913  Last data filed at 10/4/2022 0653  Gross per 24 hour   Intake 590 ml   Output 2 ml   Net 588 ml         Vital Signs (Most Recent):  Temp: 97.8 °F (36.6 °C) (10/04/22 0728)  Pulse: 70 (10/04/22 0732)  Resp: 20 (10/04/22 0732)  BP: (!) 150/85 (10/04/22 0728)  SpO2: 96 % (10/04/22 0732)    Body mass index is 30.18 kg/m².  Weight: 95.4 kg (210 lb 5.1 oz) Vital Signs (24h Range):  Temp:  [97.6 °F (36.4 °C)-98.2 °F (36.8 °C)] 97.8 °F (36.6 °C)  Pulse:  [69-97] 70  Resp:  [18-26] 20  SpO2:  [93 %-98 %] 96 %  BP: (131-170)/() 150/85     Physical Exam  Vitals reviewed.   Constitutional:       Appearance: Normal appearance.   HENT:      Head: Normocephalic and atraumatic.   Eyes:      Pupils: Pupils are equal, round, and reactive to light.    Cardiovascular:      Rate and Rhythm: Normal rate.      Heart sounds: Normal heart sounds.   Pulmonary:      Breath sounds: Wheezing and rhonchi present.   Abdominal:      Palpations: Abdomen is soft.   Musculoskeletal:         General: Normal range of motion.      Cervical back: Neck supple.   Skin:     General: Skin is warm and dry.   Neurological:      General: No focal deficit present.      Mental Status: He is alert and oriented to person, place, and time.         Lines/Drains/Airways       Peripheral Intravenous Line  Duration                  Peripheral IV - Single Lumen 10/03/22 1628 20 G Left;Posterior Hand <1 day                    Significant Labs:    Lab Results   Component Value Date    WBC 11.8 (H) 10/03/2022    HGB 18.0 10/03/2022    HCT 55.8 (H) 10/03/2022    MCV 89.6 10/03/2022     10/03/2022         BMP  Lab Results   Component Value Date     10/04/2022    K 4.5 10/04/2022    CO2 28 10/04/2022    BUN 23.3 10/04/2022    CREATININE 1.33 (H) 10/04/2022    CALCIUM 9.3 10/04/2022       ABG  No results for input(s): PH, PO2, PCO2, HCO3, BE in the last 168 hours.         Significant Imaging:  I have reviewed the pertinent imaging within the past 24 hours.      Assessment/Plan:     Assessment  Idiopathic pulmonary fibrosis diagnosed via open lung biopsy in 2008 with exacerbation  Stage II COPD  Hypertension      Plan  Continue Azithromycin and Zosyn (day 2)  O2 as needed to maintain oxygen saturation greater than 88%  Nebs and mucolytics ordered  Encouraged IS  Dr Ross reviewed CT scan and will plan for bronchoscopy tomorrow morning, NPO after midnight      DEONDRE Chahal  Pulmonary Critical Care Medicine  [facility] - Emergency Dept

## 2022-10-05 ENCOUNTER — ANESTHESIA (OUTPATIENT)
Dept: ENDOSCOPY | Facility: HOSPITAL | Age: 67
DRG: 198 | End: 2022-10-05
Payer: COMMERCIAL

## 2022-10-05 ENCOUNTER — ANESTHESIA EVENT (OUTPATIENT)
Dept: ENDOSCOPY | Facility: HOSPITAL | Age: 67
DRG: 198 | End: 2022-10-05
Payer: COMMERCIAL

## 2022-10-05 LAB
LYMPHOCYTE MANUAL BF (OHS): 1 %
NEUTROPHILS MAN BF (OHS): 99 %
WBC # FLD AUTO: 2670 /UL

## 2022-10-05 PROCEDURE — 31624 DX BRONCHOSCOPE/LAVAGE: CPT | Performed by: INTERNAL MEDICINE

## 2022-10-05 PROCEDURE — 25000003 PHARM REV CODE 250: Performed by: INTERNAL MEDICINE

## 2022-10-05 PROCEDURE — 94640 AIRWAY INHALATION TREATMENT: CPT

## 2022-10-05 PROCEDURE — 27000221 HC OXYGEN, UP TO 24 HOURS

## 2022-10-05 PROCEDURE — 89051 BODY FLUID CELL COUNT: CPT | Performed by: INTERNAL MEDICINE

## 2022-10-05 PROCEDURE — 87102 FUNGUS ISOLATION CULTURE: CPT | Performed by: INTERNAL MEDICINE

## 2022-10-05 PROCEDURE — 21400001 HC TELEMETRY ROOM

## 2022-10-05 PROCEDURE — 37000009 HC ANESTHESIA EA ADD 15 MINS: Performed by: INTERNAL MEDICINE

## 2022-10-05 PROCEDURE — 37000008 HC ANESTHESIA 1ST 15 MINUTES: Performed by: INTERNAL MEDICINE

## 2022-10-05 PROCEDURE — 94761 N-INVAS EAR/PLS OXIMETRY MLT: CPT

## 2022-10-05 PROCEDURE — 87206 SMEAR FLUORESCENT/ACID STAI: CPT | Performed by: INTERNAL MEDICINE

## 2022-10-05 PROCEDURE — 25000003 PHARM REV CODE 250: Performed by: NURSE PRACTITIONER

## 2022-10-05 PROCEDURE — 63600175 PHARM REV CODE 636 W HCPCS: Performed by: ANESTHESIOLOGY

## 2022-10-05 PROCEDURE — 25000242 PHARM REV CODE 250 ALT 637 W/ HCPCS: Performed by: NURSE PRACTITIONER

## 2022-10-05 PROCEDURE — 87116 MYCOBACTERIA CULTURE: CPT | Performed by: INTERNAL MEDICINE

## 2022-10-05 PROCEDURE — 63600175 PHARM REV CODE 636 W HCPCS: Performed by: NURSE PRACTITIONER

## 2022-10-05 PROCEDURE — 87070 CULTURE OTHR SPECIMN AEROBIC: CPT | Performed by: INTERNAL MEDICINE

## 2022-10-05 PROCEDURE — 25000003 PHARM REV CODE 250: Performed by: ANESTHESIOLOGY

## 2022-10-05 RX ORDER — LIDOCAINE HYDROCHLORIDE 20 MG/ML
15 SOLUTION OROPHARYNGEAL EVERY 6 HOURS
Status: DISCONTINUED | OUTPATIENT
Start: 2022-10-05 | End: 2022-10-05

## 2022-10-05 RX ORDER — GLYCOPYRROLATE 0.2 MG/ML
INJECTION INTRAMUSCULAR; INTRAVENOUS
Status: DISCONTINUED | OUTPATIENT
Start: 2022-10-05 | End: 2022-10-05

## 2022-10-05 RX ORDER — LIDOCAINE HYDROCHLORIDE 10 MG/ML
1 INJECTION, SOLUTION EPIDURAL; INFILTRATION; INTRACAUDAL; PERINEURAL ONCE
Status: CANCELLED | OUTPATIENT
Start: 2022-10-05 | End: 2022-10-05

## 2022-10-05 RX ORDER — ONDANSETRON 2 MG/ML
INJECTION INTRAMUSCULAR; INTRAVENOUS
Status: DISCONTINUED | OUTPATIENT
Start: 2022-10-05 | End: 2022-10-05

## 2022-10-05 RX ORDER — SODIUM CHLORIDE 0.9 % (FLUSH) 0.9 %
10 SYRINGE (ML) INJECTION
Status: CANCELLED | OUTPATIENT
Start: 2022-10-05

## 2022-10-05 RX ORDER — FENTANYL CITRATE 50 UG/ML
25 INJECTION, SOLUTION INTRAMUSCULAR; INTRAVENOUS EVERY 5 MIN PRN
Status: CANCELLED | OUTPATIENT
Start: 2022-10-05

## 2022-10-05 RX ORDER — HYDROMORPHONE HYDROCHLORIDE 2 MG/ML
0.2 INJECTION, SOLUTION INTRAMUSCULAR; INTRAVENOUS; SUBCUTANEOUS EVERY 5 MIN PRN
Status: CANCELLED | OUTPATIENT
Start: 2022-10-05

## 2022-10-05 RX ORDER — LIDOCAINE HYDROCHLORIDE 40 MG/ML
4 SOLUTION TOPICAL
Status: DISCONTINUED | OUTPATIENT
Start: 2022-10-05 | End: 2022-10-07 | Stop reason: HOSPADM

## 2022-10-05 RX ORDER — PROPOFOL 10 MG/ML
VIAL (ML) INTRAVENOUS
Status: DISCONTINUED | OUTPATIENT
Start: 2022-10-05 | End: 2022-10-05

## 2022-10-05 RX ORDER — PROPOFOL 10 MG/ML
VIAL (ML) INTRAVENOUS CONTINUOUS PRN
Status: DISCONTINUED | OUTPATIENT
Start: 2022-10-05 | End: 2022-10-05

## 2022-10-05 RX ORDER — ACETAMINOPHEN 10 MG/ML
1000 INJECTION, SOLUTION INTRAVENOUS ONCE
Status: CANCELLED | OUTPATIENT
Start: 2022-10-05 | End: 2022-10-05

## 2022-10-05 RX ORDER — FENTANYL CITRATE 50 UG/ML
INJECTION, SOLUTION INTRAMUSCULAR; INTRAVENOUS
Status: DISCONTINUED | OUTPATIENT
Start: 2022-10-05 | End: 2022-10-05

## 2022-10-05 RX ADMIN — IPRATROPIUM BROMIDE AND ALBUTEROL SULFATE 3 ML: 2.5; .5 SOLUTION RESPIRATORY (INHALATION) at 12:10

## 2022-10-05 RX ADMIN — METHYLPREDNISOLONE SODIUM SUCCINATE 80 MG: 40 INJECTION, POWDER, FOR SOLUTION INTRAMUSCULAR; INTRAVENOUS at 01:10

## 2022-10-05 RX ADMIN — ONDANSETRON 4 MG: 2 INJECTION INTRAMUSCULAR; INTRAVENOUS at 12:10

## 2022-10-05 RX ADMIN — IPRATROPIUM BROMIDE AND ALBUTEROL SULFATE 3 ML: 2.5; .5 SOLUTION RESPIRATORY (INHALATION) at 11:10

## 2022-10-05 RX ADMIN — LIDOCAINE HYDROCHLORIDE 4 ML: 40 SOLUTION TOPICAL at 12:10

## 2022-10-05 RX ADMIN — IPRATROPIUM BROMIDE AND ALBUTEROL SULFATE 3 ML: 2.5; .5 SOLUTION RESPIRATORY (INHALATION) at 03:10

## 2022-10-05 RX ADMIN — METHYLPREDNISOLONE SODIUM SUCCINATE 80 MG: 40 INJECTION, POWDER, FOR SOLUTION INTRAMUSCULAR; INTRAVENOUS at 08:10

## 2022-10-05 RX ADMIN — PIPERACILLIN AND TAZOBACTAM 4.5 G: 4; .5 INJECTION, POWDER, LYOPHILIZED, FOR SOLUTION INTRAVENOUS; PARENTERAL at 08:10

## 2022-10-05 RX ADMIN — FENTANYL CITRATE 50 MCG: 50 INJECTION, SOLUTION INTRAMUSCULAR; INTRAVENOUS at 12:10

## 2022-10-05 RX ADMIN — IPRATROPIUM BROMIDE AND ALBUTEROL SULFATE 3 ML: 2.5; .5 SOLUTION RESPIRATORY (INHALATION) at 07:10

## 2022-10-05 RX ADMIN — SODIUM CHLORIDE, SODIUM GLUCONATE, SODIUM ACETATE, POTASSIUM CHLORIDE AND MAGNESIUM CHLORIDE: 526; 502; 368; 37; 30 INJECTION, SOLUTION INTRAVENOUS at 12:10

## 2022-10-05 RX ADMIN — LIDOCAINE HYDROCHLORIDE 15 ML: 20 SOLUTION ORAL; TOPICAL at 12:10

## 2022-10-05 RX ADMIN — Medication 100 MCG/KG/MIN: at 12:10

## 2022-10-05 RX ADMIN — PROPOFOL 150 MG: 10 INJECTION, EMULSION INTRAVENOUS at 12:10

## 2022-10-05 RX ADMIN — AZITHROMYCIN MONOHYDRATE 500 MG: 500 INJECTION, POWDER, LYOPHILIZED, FOR SOLUTION INTRAVENOUS at 05:10

## 2022-10-05 RX ADMIN — PIPERACILLIN AND TAZOBACTAM 4.5 G: 4; .5 INJECTION, POWDER, LYOPHILIZED, FOR SOLUTION INTRAVENOUS; PARENTERAL at 06:10

## 2022-10-05 RX ADMIN — PIPERACILLIN AND TAZOBACTAM 4.5 G: 4; .5 INJECTION, POWDER, LYOPHILIZED, FOR SOLUTION INTRAVENOUS; PARENTERAL at 01:10

## 2022-10-05 RX ADMIN — METHYLPREDNISOLONE SODIUM SUCCINATE 80 MG: 40 INJECTION, POWDER, FOR SOLUTION INTRAMUSCULAR; INTRAVENOUS at 05:10

## 2022-10-05 RX ADMIN — GLYCOPYRROLATE 0.2 MG: 0.2 INJECTION INTRAMUSCULAR; INTRAVENOUS at 12:10

## 2022-10-05 RX ADMIN — GUAIFENESIN 600 MG: 600 TABLET, EXTENDED RELEASE ORAL at 08:10

## 2022-10-05 NOTE — ANESTHESIA POSTPROCEDURE EVALUATION
Anesthesia Post Evaluation    Patient: Arturo Arreola    Procedure(s) Performed: Procedure(s) (LRB):  BRONCHOSCOPY, FIBEROPTIC (N/A)  LAVAGE, BRONCHOALVEOLAR (Right)    Final Anesthesia Type: general      Patient location during evaluation: PACU  Patient participation: Yes- Able to Participate  Level of consciousness: awake and alert  Post-procedure vital signs: reviewed and stable  Pain management: adequate  Airway patency: patent    PONV status at discharge: No PONV  Anesthetic complications: no      Cardiovascular status: blood pressure returned to baseline  Respiratory status: spontaneous ventilation and unassisted  Hydration status: euvolemic  Follow-up needed           Vitals Value Taken Time   /85 10/05/22 1550   Temp 36.4 °C (97.5 °F) 10/05/22 1550   Pulse 65 10/05/22 1550   Resp 20 10/05/22 1550   SpO2 95 % 10/05/22 1550         No case tracking events are documented in the log.      Pain/Ericka Score: Ericka Score: 6 (10/5/2022  1:15 PM)

## 2022-10-05 NOTE — NURSING
Back to room from bronchoscopy. Pt lethargic, opens eyes to verbal and tactile stimuli. O2 in place via nasal cannula at 2L. VSS.

## 2022-10-05 NOTE — ANESTHESIA PREPROCEDURE EVALUATION
10/05/2022  Arturo Arreola is a 67 y.o., male.      Pre-op Assessment    I have reviewed the Patient Summary Reports.     I have reviewed the Nursing Notes. I have reviewed the NPO Status.   I have reviewed the Medications.     Review of Systems  Anesthesia Hx:  No problems with previous Anesthesia    Social:  Non-Smoker    Cardiovascular:   Hypertension Denies MI.    Denies Angina.  Denies CHF. hyperlipidemia    Pulmonary:   COPD (pulmonary fibrosis) Denies Asthma.    Renal/:   Denies Chronic Renal Disease. no renal calculi     Hepatic/GI:   Denies GERD. Denies Liver Disease.  Denies Hepatitis.    Neurological:   Denies CVA. Denies Seizures.    Endocrine:   Denies Diabetes. Denies Hypothyroidism. Denies Hyperthyroidism.  Denies Obesity / BMI > 30      Physical Exam  General: Well nourished, Cooperative, Alert and Oriented    Airway:  Mallampati: I   Mouth Opening: Normal  TM Distance: Normal  Tongue: Normal  Neck ROM: Normal ROM    Dental:  Intact        Anesthesia Plan  Type of Anesthesia, risks & benefits discussed:    Anesthesia Type: Gen Supraglottic Airway  Intra-op Monitoring Plan: Standard ASA Monitors  Induction:  IV  Informed Consent: Informed consent signed with the Patient and all parties understand the risks and agree with anesthesia plan.  All questions answered.   ASA Score: 3  Day of Surgery Review of History & Physical: H&P Update referred to the surgeon/provider.    Ready For Surgery From Anesthesia Perspective.     .

## 2022-10-05 NOTE — PLAN OF CARE
Problem: Adult Inpatient Plan of Care  Goal: Plan of Care Review  Outcome: Ongoing, Progressing  Goal: Patient-Specific Goal (Individualized)  Outcome: Ongoing, Progressing  Flowsheets (Taken 10/5/2022 1630)  Anxieties, Fears or Concerns: hospitalization,  Individualized Care Needs: respitory monitoring  Patient-Specific Goals (Include Timeframe): to improve respitory symptoms by 10/14/22  Goal: Absence of Hospital-Acquired Illness or Injury  Outcome: Ongoing, Progressing  Goal: Optimal Comfort and Wellbeing  Outcome: Ongoing, Progressing  Goal: Readiness for Transition of Care  Outcome: Ongoing, Progressing

## 2022-10-05 NOTE — OP NOTE
[facility]  Bronchoscopy   Procedure Note    SUMMARY     Date of Procedure: 10/5/2022    Procedure: Bronchoscopy, Diagnostic    Performed by: Rick Ross MD    Pre-Procedure Diagnosis:  Abnormal CT scan    Post-Procedure Diagnosis:  Abnormal CT scan    Anesthesia: General Anesthesia        Description of the Findings of the Procedure:     Patient was consented for the procedure with all risk and benefit of the procedure explained in detail.  Patient was given the opportunity to ask questions and all concerns were answered.  The bronchocope was inserted into the main airway via the oropharynx. An anatomical survey was done of the main airways and the subsegmental bronchus.  Significant purulent secretions were seen throughout especially a proximal left and right airway.  More purulent secretions were noted at right upper lobe and left lower lobe in other regions.  Airways were very friable in nature.  No endobronchial lesions seen.  BAL was performed that right middle lobe and sufficient purulent return was noted.  EBUS was then performed and no significant adenopathy was noted at paratracheal region, subcarinal region, station 10 R station 11 of right or left lung.  Therefore no biopsies were obtained.  BAL sent for analysis.  Mild oozing of blood noted and therefore cold saline was used in areas involved which included right lower lobe and right upper lobe      Complications: .    Estimated Blood Loss (EBL): less than 50 mL           Specimens:  BAL right middle lobe       Condition: Fair        Disposition: PACU - hemodynamically stable.    Rick Ross MD  [facility]

## 2022-10-05 NOTE — TRANSFER OF CARE
"Anesthesia Transfer of Care Note    Patient: Arturo Arreola    Procedure(s) Performed: Procedure(s) (LRB):  ENDOBRONCHIAL ULTRASOUND (EBUS) (N/A)  BRONCHOSCOPY, FIBEROPTIC (N/A)    Patient location: Telemetry/Step Down Unit    Transport from OR: Transported from OR on 2-3 L/min O2 by NC with adequate spontaneous ventilation    Post pain: adequate analgesia    Post assessment: no apparent anesthetic complications and tolerated procedure well    Post vital signs: stable    Level of consciousness: awake and alert    Nausea/Vomiting: no nausea/vomiting    Transfer of care protocol was followed      Last vitals:   Visit Vitals  /78   Pulse (!) 112   Temp 36 °C (96.8 °F)   Resp 16   Ht 5' 10" (1.778 m)   Wt 95.4 kg (210 lb 5.1 oz)   SpO2 98%   BMI 30.18 kg/m²     "

## 2022-10-05 NOTE — ANESTHESIA PROCEDURE NOTES
Intubation    Date/Time: 10/5/2022 12:49 PM  Performed by: Esha Pickett  Authorized by: Allen Elmore DO     Intubation:     Induction:  Intravenous    Intubated:  Postinduction    Mask Ventilation:  Easy mask    Attempts:  1    Attempted By:  CRNA    Difficult Airway Encountered?: No      Airway Device:  Supraglottic airway/LMA    Airway Device Size:  4.0    Style/Cuff Inflation:  Uncuffed    Secured at:  The lips    Placement Verified By:  Capnometry    Complicating Factors:  None    Findings Post-Intubation:  BS equal bilateral and atraumatic/condition of teeth unchanged

## 2022-10-06 PROCEDURE — 99900035 HC TECH TIME PER 15 MIN (STAT)

## 2022-10-06 PROCEDURE — 63600175 PHARM REV CODE 636 W HCPCS: Performed by: NURSE PRACTITIONER

## 2022-10-06 PROCEDURE — 94640 AIRWAY INHALATION TREATMENT: CPT

## 2022-10-06 PROCEDURE — 25000242 PHARM REV CODE 250 ALT 637 W/ HCPCS: Performed by: NURSE PRACTITIONER

## 2022-10-06 PROCEDURE — 93010 ELECTROCARDIOGRAM REPORT: CPT | Mod: ,,, | Performed by: INTERNAL MEDICINE

## 2022-10-06 PROCEDURE — 25000003 PHARM REV CODE 250: Performed by: NURSE PRACTITIONER

## 2022-10-06 PROCEDURE — 94761 N-INVAS EAR/PLS OXIMETRY MLT: CPT

## 2022-10-06 PROCEDURE — 99900031 HC PATIENT EDUCATION (STAT)

## 2022-10-06 PROCEDURE — 93010 EKG 12-LEAD: ICD-10-PCS | Mod: ,,, | Performed by: INTERNAL MEDICINE

## 2022-10-06 PROCEDURE — 27000221 HC OXYGEN, UP TO 24 HOURS

## 2022-10-06 PROCEDURE — 21400001 HC TELEMETRY ROOM

## 2022-10-06 PROCEDURE — 93005 ELECTROCARDIOGRAM TRACING: CPT

## 2022-10-06 RX ADMIN — PIPERACILLIN AND TAZOBACTAM 4.5 G: 4; .5 INJECTION, POWDER, LYOPHILIZED, FOR SOLUTION INTRAVENOUS; PARENTERAL at 05:10

## 2022-10-06 RX ADMIN — METHYLPREDNISOLONE SODIUM SUCCINATE 80 MG: 40 INJECTION, POWDER, FOR SOLUTION INTRAMUSCULAR; INTRAVENOUS at 02:10

## 2022-10-06 RX ADMIN — PIPERACILLIN AND TAZOBACTAM 4.5 G: 4; .5 INJECTION, POWDER, LYOPHILIZED, FOR SOLUTION INTRAVENOUS; PARENTERAL at 02:10

## 2022-10-06 RX ADMIN — IPRATROPIUM BROMIDE AND ALBUTEROL SULFATE 3 ML: 2.5; .5 SOLUTION RESPIRATORY (INHALATION) at 07:10

## 2022-10-06 RX ADMIN — AZITHROMYCIN MONOHYDRATE 500 MG: 500 INJECTION, POWDER, LYOPHILIZED, FOR SOLUTION INTRAVENOUS at 10:10

## 2022-10-06 RX ADMIN — IPRATROPIUM BROMIDE AND ALBUTEROL SULFATE 3 ML: 2.5; .5 SOLUTION RESPIRATORY (INHALATION) at 03:10

## 2022-10-06 RX ADMIN — METHYLPREDNISOLONE SODIUM SUCCINATE 80 MG: 40 INJECTION, POWDER, FOR SOLUTION INTRAMUSCULAR; INTRAVENOUS at 08:10

## 2022-10-06 RX ADMIN — GUAIFENESIN 600 MG: 600 TABLET, EXTENDED RELEASE ORAL at 08:10

## 2022-10-06 RX ADMIN — IPRATROPIUM BROMIDE AND ALBUTEROL SULFATE 3 ML: 2.5; .5 SOLUTION RESPIRATORY (INHALATION) at 04:10

## 2022-10-06 RX ADMIN — METHYLPREDNISOLONE SODIUM SUCCINATE 80 MG: 40 INJECTION, POWDER, FOR SOLUTION INTRAMUSCULAR; INTRAVENOUS at 05:10

## 2022-10-06 RX ADMIN — PIPERACILLIN AND TAZOBACTAM 4.5 G: 4; .5 INJECTION, POWDER, LYOPHILIZED, FOR SOLUTION INTRAVENOUS; PARENTERAL at 08:10

## 2022-10-06 RX ADMIN — IPRATROPIUM BROMIDE AND ALBUTEROL SULFATE 3 ML: 2.5; .5 SOLUTION RESPIRATORY (INHALATION) at 11:10

## 2022-10-06 NOTE — PROGRESS NOTES
[facility] - Emergency Dept  Pulmonary Critical Care Note    Patient Name: Arturo Arreola  MRN: [id number]  Admission Date: 10/3/2022  Hospital Length of Stay: 3 days  Code Status: Full Code  Attending Provider: SRIDHAR Damon MD  Primary Care Provider: Daniel Jones MD     Subjective:     HPI:   This is a 67 year old male with a past history of idiopathic pulmonary fibrosis diagnosed via open lung biopsy in 2008. He also has stage II COPD by pulmonary function testing. He has been doing fairly well on maintenance inhalers and no anti fibrotic drugs, imaging has been stable. He was seen in office on Friday for an exacerbation and was given azithromycin and oral steroids. He began feeling worse today and presented again to the office. CXR revealed chronic hilar fullness however exam had worsened. He was significantly dyspneic and wheezing and was sent for direct admission.     Hospital Course/Significant events:  CT Chest 10/3 with extensive emphysematous changes in bilateral upper lobes with ground glass changes and perihilar enlargement    24 Hour Interval History:  Doing well on room air. No complaints, cough much improved since bronchoscopy.     Past Medical History:   Diagnosis Date    COPD (chronic obstructive pulmonary disease)     Dyslipidemia     Essential (primary) hypertension     High cholesterol     Irregular heart beat     Pulmonary fibrosis     Sarcoidosis, unspecified     Skin cancer        Past Surgical History:   Procedure Laterality Date    LUNG BIOPSY         Social History     Socioeconomic History    Marital status:    Tobacco Use    Smoking status: Never     Passive exposure: Never    Smokeless tobacco: Never   Substance and Sexual Activity    Alcohol use: Never    Drug use: Never    Sexual activity: Not Currently     Birth control/protection: Abstinence           Current Outpatient Medications   Medication Instructions    albuterol (PROVENTIL) 2.5 mg, Nebulization,  Every 6 hours PRN, Rescue    amLODIPine (NORVASC) 10 mg, Oral    budesonide-formoterol 80-4.5 mcg (SYMBICORT) 80-4.5 mcg/actuation HFAA   See Instructions, INHALE 2 PUFFS TWICE A DAY, # 3 EA, 3 Refill(s), Pharmacy: [facility]/pharmacy #[id number], 177.5, cm, Height/Length Dosing, 07/20/21 9:02:00 CDT, 90.72, kg, Weight Dosing, 07/20/21 9:02:00 CDT    ezetimibe-simvastatin 10-40 mg (VYTORIN) 10-40 mg per tablet 1 tablet, Oral, Nightly    hydrALAZINE (APRESOLINE) 12.5 mg, Oral    predniSONE (DELTASONE) 10 MG tablet Take 4 tablets (40 mg total) by mouth once daily for 3 days, THEN 2 tablets (20 mg total) once daily for 3 days, THEN 1 tablet (10 mg total) once daily for 3 days.       Current Inpatient Medications   azithromycin  500 mg Intravenous Q24H    guaiFENesin  600 mg Oral BID    methylPREDNISolone sodium succinate injection  80 mg Intravenous Q8H    piperacillin-tazobactam (ZOSYN) IVPB  4.5 g Intravenous Q8H                Objective:       Intake/Output Summary (Last 24 hours) at 10/6/2022 0846  Last data filed at 10/6/2022 0631  Gross per 24 hour   Intake 610 ml   Output --   Net 610 ml           Vital Signs (Most Recent):  Temp: 98 °F (36.7 °C) (10/06/22 0712)  Pulse: 89 (10/06/22 0755)  Resp: 18 (10/06/22 0755)  BP: 127/73 (10/06/22 0712)  SpO2: (!) 94 % (10/06/22 0755)    Body mass index is 30.18 kg/m².  Weight: 95.4 kg (210 lb 5.1 oz) Vital Signs (24h Range):  Temp:  [96.8 °F (36 °C)-98 °F (36.7 °C)] 98 °F (36.7 °C)  Pulse:  [] 89  Resp:  [15-20] 18  SpO2:  [93 %-98 %] 94 %  BP: (125-146)/(70-90) 127/73       Exam  GENERAL: NAD, A & 0 X 3, calm  CARDIAC: RRR, NO m/g/r  PULM: CTA BL, No wheezing or rales  ABD: NT/ND/S. Bowel sounds heard  EXT: no cyanosis, clubbing, or edema, peripheral pulses intact  NEURO: no obvious neurosensory deficits, no dysarthria noted  PSYCH: no agitation or anxiety   EYES: tracks examiner around room, pupil reactive to light  NECK: trachea midline, no obvious JVD        Lines/Drains/Airways       Peripheral Intravenous Line  Duration                  Peripheral IV - Single Lumen 10/05/22 1000 20 G Anterior;Left Forearm <1 day                    Significant Labs:    Lab Results   Component Value Date    WBC 11.8 (H) 10/03/2022    HGB 18.0 10/03/2022    HCT 55.8 (H) 10/03/2022    MCV 89.6 10/03/2022     10/03/2022         BMP  Lab Results   Component Value Date     10/06/2022    K 4.1 10/06/2022    CO2 26 10/06/2022    BUN 28.2 (H) 10/06/2022    CREATININE 1.44 (H) 10/06/2022    CALCIUM 8.7 (L) 10/06/2022       ABG  No results for input(s): PH, PO2, PCO2, HCO3, BE in the last 168 hours.         Significant Imaging:  I have reviewed the pertinent imaging within the past 24 hours.      Assessment/Plan:     Assessment  Questionable Idiopathic pulmonary fibrosis (diagnosed via open lung biopsy in 2008 with exacerbation)  Stage II COPD  Hypertension      Plan  -stable from a respiratory standpoint since bronchoscopy, actually clinically feeling much better since. A lot of purulent material in airways on bronch, increasing chances of underlying infection. Does not need to stay inpatient at this time as I can call him with culture and bronch results once back. No adenopathy noted on bronch.   -levofloxacin 500 mg PO x 5 additional days recommended if plan to DC today  -can send home on prednisone 20mg daily x 5 days also     Rick Ross MD  Pulmonary Critical Care Medicine  [facility] - Emergency Dept

## 2022-10-07 VITALS
TEMPERATURE: 98 F | DIASTOLIC BLOOD PRESSURE: 66 MMHG | BODY MASS INDEX: 28.87 KG/M2 | RESPIRATION RATE: 18 BRPM | SYSTOLIC BLOOD PRESSURE: 117 MMHG | HEART RATE: 73 BPM | HEIGHT: 70 IN | OXYGEN SATURATION: 94 % | WEIGHT: 201.69 LBS

## 2022-10-07 LAB
BACTERIA SPEC CULT: NORMAL
ESTROGEN SERPL-MCNC: NORMAL PG/ML
GRAM STN SPEC: NORMAL
GRAM STN SPEC: NORMAL
INSULIN SERPL-ACNC: NORMAL U[IU]/ML
LAB AP CLINICAL INFORMATION: NORMAL
LAB AP GROSS DESCRIPTION: NORMAL
LAB AP REPORT FOOTNOTES: NORMAL
RHODAMINE-AURAMINE STN SPEC: NORMAL
T3RU NFR SERPL: NORMAL %

## 2022-10-07 PROCEDURE — 63600175 PHARM REV CODE 636 W HCPCS: Performed by: NURSE PRACTITIONER

## 2022-10-07 PROCEDURE — 94761 N-INVAS EAR/PLS OXIMETRY MLT: CPT

## 2022-10-07 PROCEDURE — 87040 BLOOD CULTURE FOR BACTERIA: CPT | Performed by: INTERNAL MEDICINE

## 2022-10-07 PROCEDURE — 25000003 PHARM REV CODE 250: Performed by: NURSE PRACTITIONER

## 2022-10-07 PROCEDURE — 94640 AIRWAY INHALATION TREATMENT: CPT

## 2022-10-07 PROCEDURE — 99900031 HC PATIENT EDUCATION (STAT)

## 2022-10-07 PROCEDURE — 25000003 PHARM REV CODE 250: Performed by: INTERNAL MEDICINE

## 2022-10-07 PROCEDURE — 36415 COLL VENOUS BLD VENIPUNCTURE: CPT | Performed by: INTERNAL MEDICINE

## 2022-10-07 PROCEDURE — 25000242 PHARM REV CODE 250 ALT 637 W/ HCPCS: Performed by: NURSE PRACTITIONER

## 2022-10-07 RX ORDER — METHYLPREDNISOLONE 4 MG/1
TABLET ORAL
Qty: 21 EACH | Refills: 0 | Status: SHIPPED | OUTPATIENT
Start: 2022-10-07 | End: 2022-10-28

## 2022-10-07 RX ORDER — LEVOFLOXACIN 500 MG/1
500 TABLET, FILM COATED ORAL DAILY
Qty: 5 TABLET | Refills: 0 | Status: SHIPPED | OUTPATIENT
Start: 2022-10-07 | End: 2023-07-25 | Stop reason: ALTCHOICE

## 2022-10-07 RX ORDER — ACETAMINOPHEN 325 MG/1
650 TABLET ORAL EVERY 8 HOURS PRN
Status: DISCONTINUED | OUTPATIENT
Start: 2022-10-07 | End: 2022-10-07 | Stop reason: HOSPADM

## 2022-10-07 RX ADMIN — IPRATROPIUM BROMIDE AND ALBUTEROL SULFATE 3 ML: 2.5; .5 SOLUTION RESPIRATORY (INHALATION) at 07:10

## 2022-10-07 RX ADMIN — GUAIFENESIN 600 MG: 600 TABLET, EXTENDED RELEASE ORAL at 09:10

## 2022-10-07 RX ADMIN — ACETAMINOPHEN 650 MG: 325 TABLET ORAL at 02:10

## 2022-10-07 RX ADMIN — METHYLPREDNISOLONE SODIUM SUCCINATE 80 MG: 40 INJECTION, POWDER, FOR SOLUTION INTRAMUSCULAR; INTRAVENOUS at 09:10

## 2022-10-07 RX ADMIN — IPRATROPIUM BROMIDE AND ALBUTEROL SULFATE 3 ML: 2.5; .5 SOLUTION RESPIRATORY (INHALATION) at 12:10

## 2022-10-07 RX ADMIN — PIPERACILLIN AND TAZOBACTAM 4.5 G: 4; .5 INJECTION, POWDER, LYOPHILIZED, FOR SOLUTION INTRAVENOUS; PARENTERAL at 01:10

## 2022-10-07 RX ADMIN — METHYLPREDNISOLONE SODIUM SUCCINATE 80 MG: 40 INJECTION, POWDER, FOR SOLUTION INTRAMUSCULAR; INTRAVENOUS at 01:10

## 2022-10-07 NOTE — PLAN OF CARE
Problem: Adult Inpatient Plan of Care  Goal: Plan of Care Review  Outcome: Ongoing, Progressing  Goal: Patient-Specific Goal (Individualized)  Outcome: Ongoing, Progressing  Goal: Absence of Hospital-Acquired Illness or Injury  Outcome: Ongoing, Progressing  Goal: Optimal Comfort and Wellbeing  Outcome: Ongoing, Progressing  Goal: Readiness for Transition of Care  Outcome: Ongoing, Progressing     Problem: Fever  Goal: Body Temperature in Desired Range  Outcome: Ongoing, Progressing

## 2022-10-07 NOTE — PLAN OF CARE
Problem: Adult Inpatient Plan of Care  Goal: Plan of Care Review  Outcome: Ongoing, Progressing  Goal: Patient-Specific Goal (Individualized)  Outcome: Ongoing, Progressing     Problem: Fever  Goal: Body Temperature in Desired Range  Outcome: Ongoing, Progressing

## 2022-10-07 NOTE — DISCHARGE SUMMARY
[facility] - Emergency Dept  Pulmonary Critical Care Note    Patient Name: Arturo Arreola  MRN: [id number]  Admission Date: 10/3/2022  Hospital Length of Stay: 4 days  Code Status: Full Code  Attending Provider: SRIDHAR Damon MD  Primary Care Provider: Daniel Jones MD     Subjective:     HPI:   This is a 67 year old male with a past history of idiopathic pulmonary fibrosis diagnosed via open lung biopsy in 2008. He also has stage II COPD by pulmonary function testing. He has been doing fairly well on maintenance inhalers and no anti fibrotic drugs, imaging has been stable. He was seen in office on Friday for an exacerbation and was given azithromycin and oral steroids. He began feeling worse on 10/3/2022 and presented again to the office. CXR revealed chronic hilar fullness however physical exam had worsened. He was significantly dyspneic and wheezing and was sent for direct admission.     Hospital Course/Significant events:  CT Chest 10/3 with extensive emphysematous changes in bilateral upper lobes with ground glass changes and perihilar enlargement. Underwent bronch on 10/5/2022- cultures/biopsies pending. Suctioned a lot of purulent secretions.     24 Hour Interval History:  Doing well on room air. No complaints, cough much improved since bronchoscopy. Ready to be discharged home.     Current Outpatient Medications   Medication Instructions    albuterol (PROVENTIL) 2.5 mg, Nebulization, Every 6 hours PRN, Rescue    amLODIPine (NORVASC) 10 mg, Oral    budesonide-formoterol 80-4.5 mcg (SYMBICORT) 80-4.5 mcg/actuation HFAA   See Instructions, INHALE 2 PUFFS TWICE A DAY, # 3 EA, 3 Refill(s), Pharmacy: [facility]/pharmacy #[id number], 177.5, cm, Height/Length Dosing, 07/20/21 9:02:00 CDT, 90.72, kg, Weight Dosing, 07/20/21 9:02:00 CDT    ezetimibe-simvastatin 10-40 mg (VYTORIN) 10-40 mg per tablet 1 tablet, Oral, Nightly    levoFLOXacin (LEVAQUIN) 500 mg, Oral, Daily    methylPREDNISolone (MEDROL DOSEPACK) 4  mg tablet use as directed   Levaquin 500 mg daily x 5 days   MDP       Objective:       Intake/Output Summary (Last 24 hours) at 10/7/2022 0906  Last data filed at 10/7/2022 0600  Gross per 24 hour   Intake 2820 ml   Output 2 ml   Net 2818 ml         Vital Signs (Most Recent):  Temp: 98.2 °F (36.8 °C) (10/07/22 0751)  Pulse: 73 (10/07/22 0751)  Resp: 18 (10/07/22 0751)  BP: 117/66 (10/07/22 0751)  SpO2: (!) 94 % (10/07/22 0751)    Body mass index is 28.94 kg/m².  Weight: 91.5 kg (201 lb 11.2 oz) Vital Signs (24h Range):  Temp:  [97.7 °F (36.5 °C)-101.7 °F (38.7 °C)] 98.2 °F (36.8 °C)  Pulse:  [62-98] 73  Resp:  [18-20] 18  SpO2:  [93 %-96 %] 94 %  BP: (117-138)/(66-79) 117/66       Lines/Drains/Airways       Peripheral Intravenous Line  Duration                  Peripheral IV - Single Lumen 10/05/22 1000 20 G Anterior;Left Forearm 1 day                    Significant Labs:      BMP  Lab Results   Component Value Date     10/07/2022    K 4.3 10/07/2022    CO2 27 10/07/2022    BUN 26.8 (H) 10/07/2022    CREATININE 1.27 (H) 10/07/2022    CALCIUM 8.9 10/07/2022       ABG  No results for input(s): PH, PO2, PCO2, HCO3, BE in the last 168 hours.         Significant Imaging:  No imaging this AM to review       Assessment/Plan:     Discharge Assessment  Idiopathic pulmonary fibrosis (diagnosed via open lung biopsy in 2008 with exacerbation)  Stage II COPD  Hypertension    Discharge Plan  -stable from a respiratory standpoint since bronchoscopy, actually clinically feeling much better since. A lot of purulent material in airways on bronch, increasing chances of underlying infection. Will discharge home on Levofloxacin 500 mg PO x 5 days with a MDP   -See back in clinic on 10/10/22 @ 3 PM with Dr. Damon.   -will follow up on pathology and cultures in clinic on Monday      Coleen T Cameron, ANP  Pulmonary Critical Care Medicine  [facility]

## 2022-10-10 NOTE — PHYSICIAN QUERY
PT Name: Arturo Arreola  MR #: [id number]     DOCUMENTATION CLARIFICATION     CDS: Yue Doss RN, CCDS   Contact Information: [email or web address]    This form is a permanent document in the medical record.    Query Date: October 10, 2022    By submitting this query, we are merely seeking further clarification of documentation.  Please utilize your independent clinical judgment when addressing the question(s) below.  The Medical Record contains the following:   Indicators   Supporting Clinical Findings Location in Medical Record    Pneumonia documented      x Chest X-Ray/CT Scan  CT Chest 10/3 with extensive emphysematous changes in bilateral upper lobes with ground glass changes and perihilar enlargement  10/4 Pulmonology, FNP [place] / Dr. Ross    x PaO2    PaCO2     O2 sat  On room air currently, only place oxygen to keep O2 saturation above 90%....SpO2 96%  10/4 Pulmonology, VICTORINOP [facility] / Dr. Ross    x WBC  10/03/22 15:56   WBC 12.0 (H)     Labs    x Vital Signs  Vital Signs (24h Range):   Temp:  [98.1 °F (36.7 °C)-98.2 °F (36.8 °C)] 98.1 °F (36.7 °C)   Pulse:  [96-97] 96   Resp:  [20] 20   SpO2:  [94 %-95 %] 94 %   BP: (144-170)/(80-83) 170/83  10/3 H&P, FNP [place]    Cultures       x Treatment   ...Significant purulent secretions were seen throughout especially a proximal left and right airway.  More purulent secretions were noted at right upper lobe and left lower lobe in other regions.  Airways were very friable in nature.  No endobronchial lesions seen.  BAL was performed that right middle lobe and sufficient purulent return was noted.       Azithromycin 500mg IV Q24H    Guaifenesin 600mg PO BID   Zosyn 4.5g IV Q8H         Encouraged IS  10/5 Bronchoscopy               10/3-10/7 MAR   10/3-10/7 MAR   10/3-10/7 MAR       10/3 H&P, FNP [place]    x Supplemental O2  O2 as needed to maintain oxygen saturation greater than 88%  10/3 H&P, FNP [place]    Dysphagia/Swallow study      x Other   ...A lot of purulent material in airways on bronch, increasing chances of underlying infection. Will discharge home on Levofloxacin 500 mg PO x 5 days with a MDP     67 year old male with a past history of idiopathic pulmonary fibrosis diagnosed via open lung biopsy in 2008. He also has stage II COPD by pulmonary function testing.   10/7 VANDANA, JESSICA Barnes     Provider, please provide the diagnosis related to the above clinical indicators:    [   ]  Unspecified Pneumonia   [   ]  Unable to Rule Out Pneumonia   [   ]  No Pneumonia   [   ]  Other respiratory diagnosis (please specify): _________   [ x ]  Clinically undetermined     Please document in your progress notes daily for the duration of treatment, until resolved, and include in your discharge summary.     Form No. [id number]

## 2022-10-12 LAB
BACTERIA BLD CULT: NORMAL
BACTERIA BLD CULT: NORMAL

## 2022-10-31 LAB
FUNGUS SPEC CULT: ABNORMAL
FUNGUS SPEC CULT: ABNORMAL

## 2022-11-19 LAB — MYCOBACTERIUM SPEC QL CULT: NORMAL

## 2023-01-09 PROBLEM — J84.112 ACUTE EXACERBATION OF IDIOPATHIC PULMONARY FIBROSIS: Status: RESOLVED | Noted: 2022-10-03 | Resolved: 2023-01-09

## 2023-07-25 PROBLEM — J44.9 CHRONIC OBSTRUCTIVE PULMONARY DISEASE: Status: ACTIVE | Noted: 2023-07-25

## 2023-07-25 PROBLEM — J84.112 IPF (IDIOPATHIC PULMONARY FIBROSIS): Status: ACTIVE | Noted: 2023-07-25

## 2023-10-30 PROBLEM — J84.112 IPF (IDIOPATHIC PULMONARY FIBROSIS): Status: RESOLVED | Noted: 2023-07-25 | Resolved: 2023-10-30

## 2024-03-25 PROBLEM — J84.112 IPF (IDIOPATHIC PULMONARY FIBROSIS): Status: RESOLVED | Noted: 2023-07-25 | Resolved: 2024-03-25

## (undated) DEVICE — NEBULIZER MISY FAST 02 TUB 7FT

## (undated) DEVICE — TUBING SUC MEDI-VAC CONN 6FT

## (undated) DEVICE — BOWL STERILE LARGE 32OZ

## (undated) DEVICE — TIP SUCTION YANKAUER

## (undated) DEVICE — ELECTRODE RED DOT EKG

## (undated) DEVICE — STOPCOCK DISCOFIX 3 WAY

## (undated) DEVICE — GOWN ISOLATION OTH SMS YLW LG

## (undated) DEVICE — FILTER BACTERIA 001851

## (undated) DEVICE — BRUSH CLEANING EBUS SMALL

## (undated) DEVICE — JELLY LUBE FOIL PK STER 2.7GR

## (undated) DEVICE — VALVE OLYMPUS SCOPE SUCTION

## (undated) DEVICE — KIT CANIST SUCTION 1200CC

## (undated) DEVICE — TUBING AIRLIFE O2 VINYL 7FT

## (undated) DEVICE — SYR SLIP TIP 10ML SHIELD

## (undated) DEVICE — SOLIDIFIER BOTTLE 1500CC

## (undated) DEVICE — OXISENSOR ADULT DIGIT N/S

## (undated) DEVICE — SYR ONLY LEUR TIP 30CC

## (undated) DEVICE — ATOMIZER NASAL DRUG DEL NO SYR

## (undated) DEVICE — BRUSH CLEANING 1-1.5X950MM

## (undated) DEVICE — ADAPTER NUT NIPPLE

## (undated) DEVICE — MASK SURG LVL 3 EYESHIELD LOOP

## (undated) DEVICE — SYR DISP LL 5CC

## (undated) DEVICE — CANNULA NASAL NONFLARE TIP 7FT

## (undated) DEVICE — LMA I-GEL  4.0 ADULT MD 50-90

## (undated) DEVICE — APPLICATOR STRL COT 2INNR 6IN

## (undated) DEVICE — WRAP STERILIZATION 45X45 DISP

## (undated) DEVICE — SYR ONLY LUER LOCK 20CC

## (undated) DEVICE — VALVE OLYMPUS SCOPE BIOPSY

## (undated) DEVICE — CONNECTOR TUBING OXYGEN

## (undated) DEVICE — ADAPTER BIOPSY VALVE

## (undated) DEVICE — ADAPTER SWIVEL

## (undated) DEVICE — TRAP MUCOUS SPECIMEN 80CCBY BU

## (undated) DEVICE — KIT CLN RIVITAL-OX ENDOSCP

## (undated) DEVICE — SOL IRR SOD CHL .9% POUR